# Patient Record
Sex: MALE | Race: WHITE | Employment: UNEMPLOYED | URBAN - METROPOLITAN AREA
[De-identification: names, ages, dates, MRNs, and addresses within clinical notes are randomized per-mention and may not be internally consistent; named-entity substitution may affect disease eponyms.]

---

## 2017-02-01 ENCOUNTER — IP HISTORICAL/CONVERTED ENCOUNTER (OUTPATIENT)
Dept: OTHER | Age: 22
End: 2017-02-01

## 2018-11-08 ENCOUNTER — OFFICE VISIT (OUTPATIENT)
Dept: SLEEP MEDICINE | Age: 23
End: 2018-11-08

## 2018-11-08 VITALS
DIASTOLIC BLOOD PRESSURE: 78 MMHG | BODY MASS INDEX: 42.66 KG/M2 | HEIGHT: 72 IN | HEART RATE: 87 BPM | OXYGEN SATURATION: 98 % | SYSTOLIC BLOOD PRESSURE: 128 MMHG | WEIGHT: 315 LBS

## 2018-11-08 DIAGNOSIS — I10 ESSENTIAL HYPERTENSION: ICD-10-CM

## 2018-11-08 DIAGNOSIS — G47.33 OBSTRUCTIVE SLEEP APNEA (ADULT) (PEDIATRIC): Primary | ICD-10-CM

## 2018-11-08 DIAGNOSIS — R73.03 PRE-DIABETES: ICD-10-CM

## 2018-11-08 PROBLEM — E66.01 OBESITY, MORBID (HCC): Status: ACTIVE | Noted: 2018-11-08

## 2018-11-08 RX ORDER — MONTELUKAST SODIUM 10 MG/1
10 TABLET ORAL DAILY
COMMUNITY

## 2018-11-08 RX ORDER — ARIPIPRAZOLE 15 MG/1
15 TABLET ORAL DAILY
COMMUNITY

## 2018-11-08 RX ORDER — LISINOPRIL 10 MG/1
TABLET ORAL DAILY
COMMUNITY

## 2018-11-08 RX ORDER — SERTRALINE HYDROCHLORIDE 100 MG/1
TABLET, FILM COATED ORAL DAILY
COMMUNITY

## 2018-11-08 NOTE — PATIENT INSTRUCTIONS
[address] [place]. [place], [address]  Tel.  [phone]  Fax. 100 [facility] 60  [place], [address]  Tel.  [phone]  Fax. [phone] [address] [place]  Tel.  [phone]  Fax. [phone]     Sleep Apnea: After Your Visit  Your Care Instructions  Sleep apnea occurs when you frequently stop breathing for 10 seconds or longer during sleep. It can be mild to severe, based on the number of times per hour that you stop breathing or have slowed breathing. Blocked or narrowed airways in your nose, mouth, or throat can cause sleep apnea. Your airway can become blocked when your throat muscles and tongue relax during sleep. Sleep apnea is common, occurring in 1 out of 20 individuals. Individuals having any of the following characteristics should be evaluated and treated right away due to high risk and detrimental consequences from untreated sleep apnea:  1. Obesity  2. Congestive Heart failure  3. Atrial Fibrillation  4. Uncontrolled Hypertension  5. Type II Diabetes  6. Night-time Arrhythmias  7. Stroke  8. Pulmonary Hypertension  9. High-risk Driving Populations (pilots, truck drivers, etc.)  10. Patients Considering Weight-loss Surgery    How do you know you have sleep apnea? You probably have sleep apnea if you answer 'yes' to 3 or more of the following questions:  S - Have you been told that you Snore? T - Are you often Tired during the day? O - Has anyone Observed you stop breathing while sleeping? P- Do you have (or are being treated for) high blood Pressure? B - Are you obese (Body Mass Index > 35)? A - Is your Age 48years old or older? N - Is your Neck size greater than 16 inches? G - Are you male Gender? A sleep physician can prescribe a breathing device that prevents tissues in the throat from blocking your airway.  Or your doctor may recommend using a dental device (oral breathing device) to help keep your airway open. In some cases, surgery may be needed to remove enlarged tissues in the throat. Follow-up care is a key part of your treatment and safety. Be sure to make and go to all appointments, and call your doctor if you are having problems. It's also a good idea to know your test results and keep a list of the medicines you take. How can you care for yourself at home? · Lose weight, if needed. It may reduce the number of times you stop breathing or have slowed breathing. · Go to bed at the same time every night. · Sleep on your side. It may stop mild apnea. If you tend to roll onto your back, sew a pocket in the back of your pajama top. Put a tennis ball into the pocket, and stitch the pocket shut. This will help keep you from sleeping on your back. · Avoid alcohol and medicines such as sleeping pills and sedatives before bed. · Do not smoke. Smoking can make sleep apnea worse. If you need help quitting, talk to your doctor about stop-smoking programs and medicines. These can increase your chances of quitting for good. · Prop up the head of your bed 4 to 6 inches by putting bricks under the legs of the bed. · Treat breathing problems, such as a stuffy nose, caused by a cold or allergies. · Use a continuous positive airway pressure (CPAP) breathing machine if lifestyle changes do not help your apnea and your doctor recommends it. The machine keeps your airway from closing when you sleep. · If CPAP does not help you, ask your doctor whether you should try other breathing machines. A bilevel positive airway pressure machine has two types of air pressureâone for breathing in and one for breathing out. Another device raises or lowers air pressure as needed while you breathe. · If your nose feels dry or bleeds when using one of these machines, talk with your doctor about increasing moisture in the air. A humidifier may help.   · If your nose is runny or stuffy from using a breathing machine, talk with your doctor about using decongestants or a corticosteroid nasal spray. When should you call for help? Watch closely for changes in your health, and be sure to contact your doctor if:  · You still have sleep apnea even though you have made lifestyle changes. · You are thinking of trying a device such as CPAP. · You are having problems using a CPAP or similar machine. Where can you learn more? Go to CiiNOW. Enter H845 in the search box to learn more about \"Sleep Apnea: After Your Visit. \"   © 9905-8820 Healthwise, [employer] Care instructions adapted under license by Mario Goodman (which disclaims liability or warranty for this information). This care instruction is for use with your licensed healthcare professional. If you have questions about a medical condition or this instruction, always ask your healthcare professional. Karen Faes any warranty or liability for your use of this information. PROPER SLEEP HYGIENE    What to avoid  · Do not have drinks with caffeine, such as coffee or black tea, for 8 hours before bed. · Do not smoke or use other types of tobacco near bedtime. Nicotine is a stimulant and can keep you awake. · Avoid drinking alcohol late in the evening, because it can cause you to wake in the middle of the night. · Do not eat a big meal close to bedtime. If you are hungry, eat a light snack. · Do not drink a lot of water close to bedtime, because the need to urinate may wake you up during the night. · Do not read or watch TV in bed. Use the bed only for sleeping and sexual activity. What to try  · Go to bed at the same time every night, and wake up at the same time every morning. Do not take naps during the day. · Keep your bedroom quiet, dark, and cool. · Get regular exercise, but not within 3 to 4 hours of your bedtime. .  · Sleep on a comfortable pillow and mattress.   · If watching the clock makes you anxious, turn it facing away from you so you cannot see the time. · If you worry when you lie down, start a worry book. Well before bedtime, write down your worries, and then set the book and your concerns aside. · Try meditation or other relaxation techniques before you go to bed. · If you cannot fall asleep, get up and go to another room until you feel sleepy. Do something relaxing. Repeat your bedtime routine before you go to bed again. · Make your house quiet and calm about an hour before bedtime. Turn down the lights, turn off the TV, log off the computer, and turn down the volume on music. This can help you relax after a busy day. Drowsy Driving  The [address] [place] [address] Road Traffic Safety Administration cites drowsiness as a causing factor in more than 151,746 police reported crashes annually, resulting in 76,000 injuries and 1,500 deaths. Other surveys suggest 55% of people polled have driven while drowsy in the past year, 23% had fallen asleep but not crashed, 3% crashed, and 2% had and accident due to drowsy driving. Who is at risk? Young Drivers: One study of drowsy driving accidents states that 55% of the drivers were under 25 years. Of those, 75% were male. Shift Workers and Travelers: People who work overnight or travel across time zones frequently are at higher risk of experiencing Circadian Rhythm Disorders. They are trying to work and function when their body is programed to sleep. Sleep Deprived: Lack of sleep has a serious impact on your ability to pay attention or focus on a task. Consistently getting less than the average of 8 hours your body needs creates partial or cumulative sleep deprivation. Untreated Sleep Disorders: Sleep Apnea, Narcolepsy, R.L.S., and other sleep disorders (untreated) prevent a person from getting enough restful sleep. This leads to excessive daytime sleepiness and increases the risk for drowsy driving accidents by up to 7 times.   Medications / Alcohol: Even over the counter medications can cause drowsiness. Medications that impair a drivers attention should have a warning label. Alcohol naturally makes you sleepy and on its own can cause accidents. Combined with excessive drowsiness its effects are amplified. Signs of Drowsy Driving:   * You don't remember driving the last few miles   * You may drift out of your yoly   * You are unable to focus and your thoughts wander   * You may yawn more often than normal   * You have difficulty keeping your eyes open / nodding off   * Missing traffic signs, speeding, or tailgating  Prevention-   Good sleep hygiene, lifestyle and behavioral choices have the most impact on drowsy driving. There is no substitute for sleep and the average person requires 8 hours nightly. If you find yourself driving drowsy, stop and sleep. Consider the sleep hygiene tips provided during your visit as well. Medication Refill Policy: Refills for all medications require 1 week advance notice. Please have your pharmacy fax a refill request. We are unable to fax, or call in \"controled substance\" medications and you will need to pick these prescriptions up from our office. [employer] Activation    Thank you for requesting access to [employer]. Please follow the instructions below to securely access and download your online medical record. [employer] allows you to send messages to your doctor, view your test results, renew your prescriptions, schedule appointments, and more. How Do I Sign Up? 1. In your internet browser, go to [email or web address]. 2. Click on the First Time User? Click Here link in the Sign In box. You will see the New Member Sign Up page. 3. Enter your [employer] Access Code exactly as it appears below. You will not need to use this code after youve completed the sign-up process. If you do not sign up before the expiration date, you must request a new code.     [employer] Access Code: [email or web address]  Expires: 2/6/2019  4:57 PM (This is the date your [employer] access code will )    4. Enter the last four digits of your Social Security Number (xxxx) and Date of Birth (mm/dd/yyyy) as indicated and click Submit. You will be taken to the next sign-up page. 5. Create a WiFastt ID. This will be your [employer] login ID and cannot be changed, so think of one that is secure and easy to remember. 6. Create a [employer] password. You can change your password at any time. 7. Enter your Password Reset Question and Answer. This can be used at a later time if you forget your password. 8. Enter your e-mail address. You will receive e-mail notification when new information is available in [address] 9. Click Sign Up. You can now view and download portions of your medical record. 10. Click the Download Summary menu link to download a portable copy of your medical information. Additional Information    If you have questions, please call [phone]. Remember, [employer] is NOT to be used for urgent needs. For medical emergencies, dial 911.

## 2018-11-08 NOTE — PROGRESS NOTES
[address] [facility] [place]. [place], [address]  Tel.  [phone]  Fax. 100 [facility] 60  [place], [address] [facility]  Tel.  [phone]  Fax. [phone] [address]  Tel.  [phone]  Fax. [phone]         Subjective:      Mary Amaya is an 26 y.o. male referred by Dr. Mackenzie Cavazos, for evaluation for a sleep disorder. He complains of snoring, snorting, periods of not breathing associated with excessive daytime sleepiness. Symptoms began a few years ago, gradually worsening since that time. He usually can fall asleep in few minutes. Family or house members note snoring, periods of not breathing. He denies difficulty falling asleep once awakened. Mary Amaya does wake up frequently at night. He is not bothered by waking up too early and left unable to get back to sleep. He actually sleeps about 10 hours at night and wakes up about 6 times during the night. He does not work shifts:  . Merary Ng indicates he does get too little sleep at night. His bedtime is 1400. He awakens at 1100. He does take naps. He takes 7 naps a week lasting 3, 4, Hour(s), 45 min to an hour. His sleep schedule is very irregular. He has the following observed behaviors: Loud snoring, Light snoring, Twitching of legs or feet, Kicking with legs;  . Other remarks: vivid dreams  He likes to play video games. He and his mom both like horror shows. Valentine Sleepiness Score: 14   which reflect mild daytime drowsiness. No Known Allergies      Current Outpatient Medications:     methylphenidate HCl (CONCERTA PO), Take 36 mg by mouth., Disp: , Rfl:     montelukast (SINGULAIR) 10 mg tablet, Take 10 mg by mouth daily. , Disp: , Rfl:     lisinopril (PRINIVIL, ZESTRIL) 10 mg tablet, Take  by mouth daily. , Disp: , Rfl:     ARIPiprazole (ABILIFY) 15 mg tablet, Take 15 mg by mouth daily. , Disp: , Rfl:     sertraline (ZOLOFT) 100 mg tablet, Take  by mouth daily. , Disp: , Rfl:      He  has a past medical history of Hypertension and Morbid obesity ([address] He  has no past surgical history on file. He family history includes Diabetes in his father, paternal grandfather, and paternal grandmother; Hypertension in his maternal grandfather and mother. He  reports that  has never smoked. he has never used smokeless tobacco. He reports that he does not drink alcohol or use drugs. Review of Systems:  Constitutional:  +weight gain. Eyes:  No blurred vision. CVS:  No significant chest pain  Pulm:  No significant shortness of breath  GI:  No significant nausea or vomiting  :  No significant nocturia  Musculoskeletal:  No significant joint pain at night  Skin:  No significant rashes  Neuro:  No significant dizziness   Psych:  Treated for depression, autism    Sleep Review of Systems: notable for no difficulty falling asleep; +frequent awakenings at night;  regular dreaming noted; no nightmares ; no early morning headaches; no memory problems; no concentration issues; no history of any automobile or occupational accidents due to daytime drowsiness. Objective:     Visit Vitals  /78 (BP 1 Location: Right arm, BP Patient Position: Sitting)   Pulse 87   Ht 6' (1.829 m)   Wt (!) 369 lb (167.4 kg)   SpO2 98%   BMI 50.05 kg/m²         General:   Not in acute distress   Eyes:  Anicteric sclerae, no obvious strabismus   Nose:  No obvious nasal septum deviation    Oropharynx:   Class 3 oropharyngeal outlet, thick tongue base, enlarged and boggy uvula, low-lying soft palate, narrow tonsilo-pharyngeal pilars   Tonsils:   tonsils are present and normal   Neck:    ; midline trachea   Chest/Lungs:  Equal lung expansion, clear on auscultation    CVS:  Normal rate, regular rhythm; no JVD   Skin:  Warm to touch; no obvious rashes   Neuro:  No focal deficits ; no obvious tremor    Psych:  Normal affect,  normal countenance;          Assessment:       ICD-10-CM ICD-9-CM    1.  Obstructive sleep apnea (adult) (pediatric) G47.33 327.23 POLYSOMNOGRAPHY 1 NIGHT   2. Essential hypertension I10 401.9    3. Pre-diabetes R73.03 790.29          Plan:     * The patient currently has a High Risk for having sleep apnea. STOP-BANG score 7.  * PSG was ordered for initial evaluation. We will follow the American Academy of Sleep Medicine protocol regarding split-night procedures and offering a trial of Positive Airway Pressure (CPAP, BPAP, etc.)  * He was provided information on sleep apnea including coresponding risk factors and the importance of proper treatment. * Counseling was provided regarding proper sleep hygiene and safe driving. Treatment options for sleep apnea were reviewed. They understand that his sleepiness is affected by some of his medications as well as his irregular sleep schedule. he is not against a trial of PAP if found to have significant sleep apnea. I will call his mom with the results. 2. Hypertension - he continues on his current regimen. I have reviewed the relationship between hypertension as it relates to sleep-disordered breathing. 3. Pre-diabetes - he continues on his current regimen. I have reviewed the relationship between sleep disordered breathing as it relates to diabetes. Thank you for allowing us to participate in your patient's medical care. We'll keep you updated on these investigations.   Electronically signed by    Mery Maki MD  Diplomate in Sleep Medicine  [facility]

## 2018-12-05 ENCOUNTER — HOSPITAL ENCOUNTER (OUTPATIENT)
Dept: SLEEP MEDICINE | Age: 23
Discharge: HOME | End: 2018-12-05
Attending: INTERNAL MEDICINE
Payer: COMMERCIAL

## 2018-12-05 VITALS
SYSTOLIC BLOOD PRESSURE: 116 MMHG | TEMPERATURE: 99.3 F | WEIGHT: 315 LBS | OXYGEN SATURATION: 95 % | BODY MASS INDEX: 42.66 KG/M2 | DIASTOLIC BLOOD PRESSURE: 76 MMHG | HEART RATE: 90 BPM | HEIGHT: 72 IN

## 2018-12-05 DIAGNOSIS — G47.33 OBSTRUCTIVE SLEEP APNEA (ADULT) (PEDIATRIC): ICD-10-CM

## 2018-12-05 PROCEDURE — 95810 POLYSOM 6/> YRS 4/> PARAM: CPT | Performed by: INTERNAL MEDICINE

## 2018-12-11 ENCOUNTER — DOCUMENTATION ONLY (OUTPATIENT)
Dept: SLEEP MEDICINE | Age: 23
End: 2018-12-11

## 2018-12-17 ENCOUNTER — OFFICE VISIT (OUTPATIENT)
Dept: SLEEP MEDICINE | Age: 23
End: 2018-12-17

## 2018-12-17 ENCOUNTER — DOCUMENTATION ONLY (OUTPATIENT)
Dept: SLEEP MEDICINE | Age: 23
End: 2018-12-17

## 2018-12-17 DIAGNOSIS — G47.33 OBSTRUCTIVE SLEEP APNEA (ADULT) (PEDIATRIC): Primary | ICD-10-CM

## 2018-12-17 NOTE — PROGRESS NOTES
217 [facility] [place]. [place], [address]  Tel.  [phone]  Fax. 100 [facility] 60  [place], [address] [facility]  Tel.  [phone]  Fax. [phone] [address] [facility] Jerri Abel   Tel.  [phone]  Fax. [phone]       S>Jase Torres is a 26 y.o. male seen for for a PAP desensitization session prior to Auto titration therapy. · Physician orders were reviewed. · Mask evaluation was performed. He was likewise informed on the use of his mask. · A Dreamware full face mask (medium) was fit and the patient demonstrated adequate comfort. · CPAP was initiated at [facility] and increased up to [address] [place] [address]. Patient was comfortable at all pressure levels. · The patient demonstrated good understanding of the positive airway pressure device. O>    There were no vitals taken for this visit. A>  1. Obstructive sleep apnea (adult) (pediatric)      AHI = 33.       P>    General information regarding operations and maintenance of the device was provided. He was provided information on sleep apnea including coresponding risk factors and the importance of proper treatment. Follow-up appointment was made with the physician to evaluate Auto titration results. He was asked to contact our office for any problems regarding his PAP therapy.

## 2018-12-31 ENCOUNTER — HOSPITAL ENCOUNTER (OUTPATIENT)
Dept: ULTRASOUND IMAGING | Age: 23
Discharge: HOME | End: 2018-12-31
Attending: INTERNAL MEDICINE
Payer: COMMERCIAL

## 2018-12-31 DIAGNOSIS — R94.5 NONSPECIFIC ABNORMAL RESULTS OF LIVER FUNCTION STUDY: ICD-10-CM

## 2018-12-31 PROCEDURE — 76700 US EXAM ABDOM COMPLETE: CPT

## 2019-01-03 ENCOUNTER — TELEPHONE (OUTPATIENT)
Dept: SLEEP MEDICINE | Age: 24
End: 2019-01-03

## 2019-01-03 ENCOUNTER — DOCUMENTATION ONLY (OUTPATIENT)
Dept: SLEEP MEDICINE | Age: 24
End: 2019-01-03

## 2019-01-03 DIAGNOSIS — G47.33 OBSTRUCTIVE SLEEP APNEA (ADULT) (PEDIATRIC): Primary | ICD-10-CM

## 2019-01-03 NOTE — TELEPHONE ENCOUNTER
Note reviewed from clinic visit today. Order attached    Order PAP and call patient and let them know which DME company they should be hearing from. Schedule for first adherence visit in 6 weeks.

## 2019-04-10 ENCOUNTER — TELEPHONE (OUTPATIENT)
Dept: SLEEP MEDICINE | Age: 24
End: 2019-04-10

## 2019-04-10 NOTE — TELEPHONE ENCOUNTER
A representative with [facility] is requesting documentation as to why the patient cannot tolerate CPAP. Or they want to see the titration study. Please advise. Thanks!

## 2019-04-11 NOTE — TELEPHONE ENCOUNTER
[facility] called again stating that the patient can come in for a face to face visit to qualify for a bipap device. The representative from [facility] stated that we just need to document that the patient tried and failed CPAP.

## 2019-04-25 NOTE — TELEPHONE ENCOUNTER
I spoke with [facility] today to get to the root of where the \"bilevel order\" came from. It seems, a document was scanned in patients media with concerns that patient wasn't benefiting from the CPAP therapy per [facility] RT department who monitors the data. It was suggested that the patient be set up on a Bilevel. A CMN was faxed to us from [facility] with a Bilevel checked off and repopulated for us to sign. This is the reason for the bipap order calls. I will call the patient to schedule them for a follow up visit since it does not look like they returned for the first adherence visit. To prevent continued calls regarding the need for the \"missing documentation\" I asked that Raul cancel this request for now until our physician can review the medical necessity.

## 2019-05-01 ENCOUNTER — OFFICE VISIT (OUTPATIENT)
Dept: SLEEP MEDICINE | Age: 24
End: 2019-05-01

## 2019-05-01 VITALS
OXYGEN SATURATION: 96 % | WEIGHT: 308 LBS | HEIGHT: 72 IN | SYSTOLIC BLOOD PRESSURE: 103 MMHG | DIASTOLIC BLOOD PRESSURE: 67 MMHG | HEART RATE: 62 BPM | BODY MASS INDEX: 41.72 KG/M2

## 2019-05-01 DIAGNOSIS — E66.01 OBESITY, MORBID (HCC): ICD-10-CM

## 2019-05-01 DIAGNOSIS — G47.33 OBSTRUCTIVE SLEEP APNEA (ADULT) (PEDIATRIC): Primary | ICD-10-CM

## 2019-05-01 NOTE — PROGRESS NOTES
[address] [place]. [place], [address]   Tel.  [phone]   Fax. [address]   [place], [address]   Tel.  [phone]   Fax. [phone] [address]    Tel.  [phone]   Fax. [phone]       Subjective:   Benjie Madrid is a 26 y.o. male seen for a positive airway pressure follow-up. He is accompanied by his mother who is POA. He reports problems with mask comfort and difficulty breathing while using CPAP but states he is committed to treatment and understands the health benefits. He has lost 60 pounds in the past 6 months, his mother would like to know if oral appliance is an option or there are other options. The following problems are identified:      Drowsiness yes Problems exhaling yes   Snoring no Forget to put on no   Mask Comfortable no Can't fall asleep no   Dry Mouth no Mask falls off no   Air Leaking yes Frequent awakenings yes     He admits that his sleep has not changed on PAP therapy using full face mask and heated tubing but acknowledges that he does not use it the entire night when sleeping. Review of device download received from [facility] on 4/1/2019 indicated:  Auto presurre: 4-8 cmH2O; Avg time in large leak per day: 1 hr, 39 min; % Used Days >= 4 hours: 43.  Patient and mother encouraged to bring device in future so that we can access usage data directly. Therapy Apnea Index averaged over PAP use: 11.6 /hr which reflects improved sleep breathing condition. [place] Sleepiness Score: 10 and Modified F.O.S.Q. Score Total / 2: 14   which reflect improved sleep quality over therapy time (11/8/2018 ESS of 14). No Known Allergies    He has a current medication list which includes the following prescription(s): methylphenidate hcl, montelukast, lisinopril, aripiprazole, and sertraline. He  has a past medical history of Hypertension and Morbid obesity ([facility] [address]     [place] Sleepiness Score: 10 and Modified F.O.S.Q. Score Total / 2: 14   which reflect improved sleep quality over therapy time (11/8/2018 ESS of 14). Sleep Review of Systems: notable for Negative difficulty falling asleep; Negative awakenings at night; Positive percieved regular dreaming; Negative nightmares; Negative early morning headaches; Negative memory problems; Positive concentration issues; Negative chest pain; Negative shortness of breath; Negative significant joint pain at night; Negative significant muscle pain at night; Negative rashes or itching; Negative heartburn; Negative significant mood issues; 3 afternoon naps per week;  Negative caffeine;  Negative alcohol; Negative history of any automobile or occupational accidents due to daytime drowsiness. Objective:     Visit Vitals  /67 (BP 1 Location: Right arm, BP Patient Position: Sitting)   Pulse 62   Ht 6' (1.829 m)   Wt 308 lb (139.7 kg)   SpO2 96%   BMI 41.77 kg/m²    Body mass index is 41.77 kg/m². General:   Alert, oriented, not in acute distress   Eyes:  Anicteric Sclerae; no obvious strabismus   Nose:  No obvious nasal septum deviation    Oropharynx:   Mallampati score 4, thick tongue base, uvula not seen due to low-lying soft palate, narrow tonsilo-pharyngeal pilars, tongue scalloped,    Neck:    , midline trachea   Chest/Lungs:  Symmetrical lung expansion , clear lung fields on auscultation    CVS:  Normal rate, regular rhythm,  no JVD   Extremities:  No obvious rashes, absent edema    Neuro:  No focal deficits; No obvious tremor    Psych:  Normal affect,  normal countenance       Assessment:    he is not compliant with PAP therapy although when used PAP shows benefit to the patient and remains necessary for control of his sleep apnea. ICD-10-CM ICD-9-CM    1. Obstructive sleep apnea (adult) (pediatric) G47.33 327.23    2. Obesity, morbid ([facility] [address] E66.01 278.01      AHI = 33 (12/2018). On APAP :  4-8 cmH2O.       Plan:       Follow-up and Dispositions    · Return if symptoms worsen or fail to improve. * Continue on current pressures and PAP compliance download in 2 weeks to assess whether Mr. Javier Serrano has been able to increase usage time. I will call to review compliance data with him and his mother. * Tech will contact pt regarding coming in for CPAP clinic to evaluate mask and provide education regarding leak. * Discussed with Mr. Javier Serrano and his mother who is POA if CPAP does not work we can consider Bi-PAP or oral appliance given recent weight loss. * Recommended continued weight loss program through appropriate diet and exercise regimen as significant weight reduction has been shown to reduce severity of obstructive sleep apnea. * Counseling was provided regarding the importance of regular PAP use with emphasis on ensuring sufficient total sleep time and proper sleep hygiene. * Re-enforced proper and regular cleaning for the device. *He was asked to contact our office for any problems regarding  PAP therapy. his phone number [phone] (home)  was reviewed and confirmed for accuracy. He gives permission for messages regarding results and appointments to be left at that number. SALONI Hoover-BC, [address] [place] [address]  Electronically signed.  05/01/19

## 2019-05-01 NOTE — PATIENT INSTRUCTIONS
[address] [facility] [place], [address]  Tel.  [phone]  Fax. [address] [facility] [address]  [place], [address]  Tel.  [phone]  Fax. [phone] [address]  Tel.  [phone]  Fax. [phone]     Learning About CPAP for Sleep Apnea  What is CPAP? CPAP is a small machine that you use at home every night while you sleep. It increases air pressure in your throat to keep your airway open. When you have sleep apnea, this can help you sleep better so you feel much better. CPAP stands for \"continuous positive airway pressure. \"  The CPAP machine will have one of the following:  · A mask that covers your nose and mouth  · Prongs that fit into your nose  · A mask that covers your nose only, the most common type. This type is called NCPAP. The N stands for \"nasal.\"  Why is it done? CPAP is usually the best treatment for obstructive sleep apnea. It is the first treatment choice and the most widely used. Your doctor may suggest CPAP if you have:  · Moderate to severe sleep apnea. · Sleep apnea and coronary artery disease (CAD) or heart failure. How does it help? · CPAP can help you have more normal sleep, so you feel less sleepy and more alert during the daytime. · CPAP may help keep heart failure or other heart problems from getting worse. · NCPAP may help lower your blood pressure. · If you use CPAP, your bed partner may also sleep better because you are not snoring or restless. What are the side effects? Some people who use CPAP have:  · A dry or stuffy nose and a sore throat. · Irritated skin on the face. · Sore eyes. · Bloating. If you have any of these problems, work with your doctor to fix them. Here are some things you can try:  · Be sure the mask or nasal prongs fit well. · See if your doctor can adjust the pressure of your CPAP. · If your nose is dry, try a humidifier.   · If your nose is runny or stuffy, try decongestant medicine or a steroid nasal spray. If these things do not help, you might try a different type of machine. Some machines have air pressure that adjusts on its own. Others have air pressures that are different when you breathe in than when you breathe out. This may reduce discomfort caused by too much pressure in your nose. Where can you learn more? Go to [employer]  Enter Iwona Ferro in the search box to learn more about \"Learning About CPAP for Sleep Apnea. \"   © 7721-5656 Healthwise, Incorporated. Care instructions adapted under license by [facility] (which disclaims liability or warranty for this information). This care instruction is for use with your licensed healthcare professional. If you have questions about a medical condition or this instruction, always ask your healthcare professional. [address] any warranty or liability for your use of this information. Content Version: 0.5.77322; Last Revised: January 11, 2010  PROPER SLEEP HYGIENE    What to avoid  · Do not have drinks with caffeine, such as coffee or black tea, for 8 hours before bed. · Do not smoke or use other types of tobacco near bedtime. Nicotine is a stimulant and can keep you awake. · Avoid drinking alcohol late in the evening, because it can cause you to wake in the middle of the night. · Do not eat a big meal close to bedtime. If you are hungry, eat a light snack. · Do not drink a lot of water close to bedtime, because the need to urinate may wake you up during the night. · Do not read or watch TV in bed. Use the bed only for sleeping and sexual activity. What to try  · Go to bed at the same time every night, and wake up at the same time every morning. Do not take naps during the day. · Keep your bedroom quiet, dark, and cool. · Get regular exercise, but not within 3 to 4 hours of your bedtime. .  · Sleep on a comfortable pillow and mattress.   · If watching the clock makes you anxious, turn it facing away from you so you cannot see the time. · If you worry when you lie down, start a worry book. Well before bedtime, write down your worries, and then set the book and your concerns aside. · Try meditation or other relaxation techniques before you go to bed. · If you cannot fall asleep, get up and go to another room until you feel sleepy. Do something relaxing. Repeat your bedtime routine before you go to bed again. · Make your house quiet and calm about an hour before bedtime. Turn down the lights, turn off the TV, log off the computer, and turn down the volume on music. This can help you relax after a busy day. Drowsy Driving: The [facility] cites drowsiness as a causing factor in more than 784,866 police reported crashes annually, resulting in 76,000 injuries and 1,500 deaths. Other surveys suggest 55% of people polled have driven while drowsy in the past year, 23% had fallen asleep but not crashed, 3% crashed, and 2% had and accident due to drowsy driving. Who is at risk? Young Drivers: One study of drowsy driving accidents states that 55% of the drivers were under 25 years. Of those, 75% were male. Shift Workers and Travelers: People who work overnight or travel across time zones frequently are at higher risk of experiencing Circadian Rhythm Disorders. They are trying to work and function when their body is programed to sleep. Sleep Deprived: Lack of sleep has a serious impact on your ability to pay attention or focus on a task. Consistently getting less than the average of 8 hours your body needs creates partial or cumulative sleep deprivation. Untreated Sleep Disorders: Sleep Apnea, Narcolepsy, R.L.S., and other sleep disorders (untreated) prevent a person from getting enough restful sleep. This leads to excessive daytime sleepiness and increases the risk for drowsy driving accidents by up to 7 times.   Medications / Alcohol: Even over the counter medications can cause drowsiness. Medications that impair a drivers attention should have a warning label. Alcohol naturally makes you sleepy and on its own can cause accidents. Combined with excessive drowsiness its effects are amplified. Signs of Drowsy Driving:   * You don't remember driving the last few miles   * You may drift out of your yoly   * You are unable to focus and your thoughts wander   * You may yawn more often than normal   * You have difficulty keeping your eyes open / nodding off   * Missing traffic signs, speeding, or tailgating  Prevention-   Good sleep hygiene, lifestyle and behavioral choices have the most impact on drowsy driving. There is no substitute for sleep and the average person requires 8 hours nightly. If you find yourself driving drowsy, stop and sleep. Consider the sleep hygiene tips provided during your visit as well. Medication Refill Policy: Refills for all medications require 1 week advance notice. Please have your pharmacy fax a refill request. We are unable to fax, or call in \"controled substance\" medications and you will need to pick these prescriptions up from our office. [employer] Activation    Thank you for requesting access to [employer]. Please follow the instructions below to securely access and download your online medical record. [employer] allows you to send messages to your doctor, view your test results, renew your prescriptions, schedule appointments, and more. How Do I Sign Up? 1. In your internet browser, go to [email or web address]. 2. Click on the First Time User? Click Here link in the Sign In box. You will see the New Member Sign Up page. 3. Enter your [employer] Access Code exactly as it appears below. You will not need to use this code after youve completed the sign-up process. If you do not sign up before the expiration date, you must request a new code.     [employer] Access Code: [email or web address]  Expires: 6/15/2019  3:48 PM (This is the date your [employer] access code will )    4. Enter the last four digits of your Social Security Number (xxxx) and Date of Birth (mm/dd/yyyy) as indicated and click Submit. You will be taken to the next sign-up page. 5. Create a [employer] ID. This will be your [employer] login ID and cannot be changed, so think of one that is secure and easy to remember. 6. Create a [employer] password. You can change your password at any time. 7. Enter your Password Reset Question and Answer. This can be used at a later time if you forget your password. 8. Enter your e-mail address. You will receive e-mail notification when new information is available in [address] 9. Click Sign Up. You can now view and download portions of your medical record. 10. Click the Download Summary menu link to download a portable copy of your medical information. Additional Information    If you have questions, please call [phone]. Remember, [employer] is NOT to be used for urgent needs. For medical emergencies, dial 911.

## 2019-05-03 NOTE — PROGRESS NOTES
Download reviewed and case discussed with NP  Agree with plan  Note reviewed    Electronically signed by    Sylvia Nuñez MD  Diplomate in Sleep Medicine  [employer]

## 2019-05-23 ENCOUNTER — TELEPHONE (OUTPATIENT)
Dept: SLEEP MEDICINE | Age: 24
End: 2019-05-23

## 2019-05-23 DIAGNOSIS — G47.33 OSA (OBSTRUCTIVE SLEEP APNEA): Primary | ICD-10-CM

## 2019-05-23 NOTE — TELEPHONE ENCOUNTER
Spoke with pt mother who is POA regarding non-adherence per recent copy of PAP download. Mom reports pt is autistic and is not routinely compliant for greater than 4 hours despite coaching and encouragement from family. [employer] is DME, fax received from them showing compliance data, pt is using device but being credited for only a portion of use. Discussed with pt mother who suspects he is disconnecting hose to use bathroom and not re-connecting at night. Pt is 23 and mom reports not being able to [facility] him\" connect mask and use device entire night. DME has sent letters stating non-compliance and insurance coverage denial.      Mother would like to discontinue PAP therapy and pursue oral appliance therapy with a referral to Dr. Pearl Allen. She understands that PAP therapy is gold standard and OAT is less effective at managing MEENU, however she feels he is likely to be more compliant with OAT. Pt AHI 33/hr supine with majority as hypopneas (199 out of 217 events). Order to be sent to DME to discontinue PAP per pt request.    Referral to Grzegorz Gonzales DDS for OAT.     Kalli Eric NP, [facility]  05/23/19

## 2019-05-30 ENCOUNTER — DOCUMENTATION ONLY (OUTPATIENT)
Dept: SLEEP MEDICINE | Age: 24
End: 2019-05-30